# Patient Record
(demographics unavailable — no encounter records)

---

## 2025-07-08 NOTE — HISTORY OF PRESENT ILLNESS
[de-identified] : 07/08/2025 Rolled right ankle 5 weeks ago, went to urgent care for xrays, was then sent for MRI which showed peroneal tendinopathy with partial tearing. She did some PT, but stopped due to the costs. Has been wearing ASO and is FWB [FreeTextEntry1] : RT ankle [FreeTextEntry5] : RT ankle pain that was injured 1 month ago. HAd gotten MRi by [facility].

## 2025-07-08 NOTE — ASSESSMENT
[FreeTextEntry1] : I reviewed the MRI of her right ankle AAROM, heel/toe raises discussed May drive Continue in ASO during work

## 2025-07-08 NOTE — REASON FOR VISIT
Spoke to patient he will adjust his toujeo to 52 units and provide us with Bg logs, in the meantime I will try and help him figure out medicare/pace/[facility] [employer] problem  [FreeTextEntry2] : RT ankle pain